# Patient Record
Sex: FEMALE | Race: OTHER | HISPANIC OR LATINO
[De-identification: names, ages, dates, MRNs, and addresses within clinical notes are randomized per-mention and may not be internally consistent; named-entity substitution may affect disease eponyms.]

---

## 2017-03-03 PROBLEM — Z00.00 ENCOUNTER FOR PREVENTIVE HEALTH EXAMINATION: Status: ACTIVE | Noted: 2017-03-03

## 2017-03-10 ENCOUNTER — APPOINTMENT (OUTPATIENT)
Dept: OBGYN | Facility: CLINIC | Age: 22
End: 2017-03-10

## 2017-04-14 ENCOUNTER — APPOINTMENT (OUTPATIENT)
Dept: OBGYN | Facility: CLINIC | Age: 22
End: 2017-04-14

## 2017-04-14 VITALS
WEIGHT: 209 LBS | DIASTOLIC BLOOD PRESSURE: 75 MMHG | SYSTOLIC BLOOD PRESSURE: 110 MMHG | HEIGHT: 63 IN | BODY MASS INDEX: 37.03 KG/M2

## 2017-04-14 DIAGNOSIS — L70.9 ACNE, UNSPECIFIED: ICD-10-CM

## 2017-04-14 DIAGNOSIS — Z78.9 OTHER SPECIFIED HEALTH STATUS: ICD-10-CM

## 2017-04-14 DIAGNOSIS — E88.81 METABOLIC SYNDROME: ICD-10-CM

## 2017-04-14 DIAGNOSIS — Z01.419 ENCOUNTER FOR GYNECOLOGICAL EXAMINATION (GENERAL) (ROUTINE) W/OUT ABNORMAL FINDINGS: ICD-10-CM

## 2017-04-14 DIAGNOSIS — A74.9 CHLAMYDIAL INFECTION, UNSPECIFIED: ICD-10-CM

## 2017-04-18 ENCOUNTER — APPOINTMENT (OUTPATIENT)
Dept: OBGYN | Facility: CLINIC | Age: 22
End: 2017-04-18

## 2017-04-18 LAB
C TRACH RRNA SPEC QL NAA+PROBE: NORMAL
HBV SURFACE AG SER QL: NONREACTIVE
HCV AB SER QL: NONREACTIVE
HCV S/CO RATIO: 0.12 S/CO
HIV1+2 AB SPEC QL IA.RAPID: NONREACTIVE
N GONORRHOEA RRNA SPEC QL NAA+PROBE: NORMAL
RPR SER QL: NORMAL
SOURCE AMPLIFICATION: NORMAL

## 2017-04-19 LAB
SOURCE AMPLIFICATION: NORMAL
T VAGINALIS RRNA SPEC QL NAA+PROBE: NEGATIVE

## 2017-04-21 LAB — CYTOLOGY CVX/VAG DOC THIN PREP: NORMAL

## 2017-04-25 ENCOUNTER — APPOINTMENT (OUTPATIENT)
Dept: OBGYN | Facility: CLINIC | Age: 22
End: 2017-04-25

## 2017-12-20 ENCOUNTER — TRANSCRIPTION ENCOUNTER (OUTPATIENT)
Age: 22
End: 2017-12-20

## 2018-04-05 ENCOUNTER — APPOINTMENT (OUTPATIENT)
Dept: OBGYN | Facility: CLINIC | Age: 23
End: 2018-04-05

## 2019-02-19 ENCOUNTER — TRANSCRIPTION ENCOUNTER (OUTPATIENT)
Age: 24
End: 2019-02-19

## 2019-09-10 ENCOUNTER — TRANSCRIPTION ENCOUNTER (OUTPATIENT)
Age: 24
End: 2019-09-10

## 2020-06-12 ENCOUNTER — EMERGENCY (EMERGENCY)
Facility: HOSPITAL | Age: 25
LOS: 0 days | Discharge: HOME | End: 2020-06-12
Attending: EMERGENCY MEDICINE
Payer: MEDICAID

## 2020-06-12 VITALS — WEIGHT: 205.03 LBS | HEIGHT: 63 IN

## 2020-06-12 VITALS
OXYGEN SATURATION: 100 % | DIASTOLIC BLOOD PRESSURE: 83 MMHG | SYSTOLIC BLOOD PRESSURE: 126 MMHG | RESPIRATION RATE: 18 BRPM | HEART RATE: 98 BPM

## 2020-06-12 DIAGNOSIS — H92.03 OTALGIA, BILATERAL: ICD-10-CM

## 2020-06-12 PROCEDURE — 99282 EMERGENCY DEPT VISIT SF MDM: CPT

## 2020-06-12 NOTE — ED ADULT TRIAGE NOTE - CHIEF COMPLAINT QUOTE
Patient with "ear sensitivity."  States she is having issues hearing in both ears and having pain.  It started in the right ear and now is in both.  Denies fevers, drainage or redness to area.

## 2020-06-12 NOTE — ED STATDOCS - CARE PROVIDER_API CALL
Nigel Serra  NEUROLOGY  [address] [address]  Phone: [phone]  Fax: [phone]  Follow Up Time:

## 2020-06-12 NOTE — ED STATDOCS - PATIENT PORTAL LINK FT
You can access the FollowMyHealth Patient Portal offered by [facility] by registering at the following website: [email or web address]. By joining [facility]’s FollowMyHealth portal, you will also be able to view your health information using other applications (apps) compatible with our system.

## 2020-06-12 NOTE — ED STATDOCS - CLINICAL SUMMARY MEDICAL DECISION MAKING FREE TEXT BOX
Pt has normal neurological exam and nothing in H&P suggests neuro imaging, pt advised that if she has any recurring sx, she can f/u with neurology or repeat visit in ED.

## 2020-06-12 NOTE — ED STATDOCS - NSFOLLOWUPINSTRUCTIONS_ED_ALL_ED_FT
Follow up with a neurologist for further evaluation of your symptoms. You can take motrin or tylenol for pain and avoid more exposure to loud noises.    Return to the ER for any new or other concerns.

## 2020-06-12 NOTE — ED STATDOCS - ATTENDING CONTRIBUTION TO CARE
I, Gary Means, performed the initial face to face bedside interview with this patient regarding history of present illness, review of symptoms and relevant past medical, social and family history.  I completed an independent physical examination.  I was the initial provider who evaluated this patient. I have signed out the follow up of any pending tests (i.e. labs, radiological studies) to the ACP.  I have communicated the patient’s plan of care and disposition with the ACP.  The history, relevant review of systems, past medical and surgical history, medical decision making, and physical examination was documented by the scribe in my presence and I attest to the accuracy of the documentation.

## 2020-06-12 NOTE — ED STATDOCS - OBJECTIVE STATEMENT
23 y/o F presents with bilateral ear pain. Pt states she feels increased sensitivity to sound, never experienced similar sx. States she was exposed to loud noise a year ago a child screamed in her ear and she heard a popping noise in her ear. Pt went to ED then due to feeling facial numbness and had imaging done. Then a few weeks ago pt had her dog bark in her right ear and she felt a similar pain and now pain is radiating to left ear as well. Denies drainage from ears. Pt went to ENT today and had a hearing test done. Denies numbness and tingling to extremities.

## 2020-06-12 NOTE — ED STATDOCS - PROGRESS NOTE DETAILS
25 yo female presents with b/l ear paina nd tinglign toteh R face. Pt states she was exposed to a long noise after a child yelled in her R ear approximately 1 year ago and had a partial MRI (bc she couldn't tolerate the MRI) and a CT which were unremarkable. A few weeks ago, she was exposed to her dog barking in her R ear and is starting to have those symptoms again. Pt was seen by an ENt specialist today and had a hearing test which was unremarkable. Pt's neuro exam was unremarkable. Pt states she had a MRI when she was seen last year at [facility] and requesting another MRI. Pt advised that MRIs are usually done in an outpt setting and we can give a number for a neurologist that she can f/u with who can eventually order the test if warranted. Pt aware and agrees with plan. -Prasanna Triplett PA-C

## 2020-06-15 PROBLEM — Z78.9 OTHER SPECIFIED HEALTH STATUS: Chronic | Status: ACTIVE | Noted: 2020-06-12

## 2020-06-17 ENCOUNTER — APPOINTMENT (OUTPATIENT)
Dept: NEUROLOGY | Facility: CLINIC | Age: 25
End: 2020-06-17
Payer: MEDICAID

## 2020-06-17 ENCOUNTER — TRANSCRIPTION ENCOUNTER (OUTPATIENT)
Age: 25
End: 2020-06-17

## 2020-06-17 DIAGNOSIS — H02.401 UNSPECIFIED PTOSIS OF RIGHT EYELID: ICD-10-CM

## 2020-06-17 DIAGNOSIS — R20.0 ANESTHESIA OF SKIN: ICD-10-CM

## 2020-06-17 DIAGNOSIS — F40.298 OTHER SPECIFIED PHOBIA: ICD-10-CM

## 2020-06-17 PROCEDURE — 99205 OFFICE O/P NEW HI 60 MIN: CPT | Mod: 95

## 2020-06-17 RX ORDER — ALPRAZOLAM 0.25 MG/1
0.25 TABLET ORAL
Qty: 4 | Refills: 0 | Status: ACTIVE | COMMUNITY
Start: 2020-06-17 | End: 1900-01-01

## 2020-06-17 NOTE — HISTORY OF PRESENT ILLNESS
[Home] : at home, [unfilled] , at the time of the visit. [Medical Office: ([facility])___] : at the medical office located in  [Verbal consent obtained from patient] : the patient, [unfilled] [FreeTextEntry1] : 24-year-old right-handed female with PMHx of anxiety; presents today for evaluation of severe hypersensitivity to loud sounds/hyperacusis right ear, intermittent right facial hypersensitivity/numbness and one recent episode of transient right eyelid droop.\par \par Patient reports it started a year ago, she was at work, (works in pre-school); one of the children shouted / shrieked in her right ear, soon after she heard a pop and right ear, it was followed by numbness right side of face and arm, it persisted, she went to [facility], ER, had CT scan and MRI brain, MRI was incomplete as she could not tolerate the study, she reports that she was told she may have mild Chiari malformation, studies are not available for me to review. Patient reports that over the past year, her symptoms improved remarkably.\par \par A month ago, she reports that her dog was barking loudly, since then she has had severe hypersensitivity post sound on the right side, in addition she has a strange hypersensitivity sensation in the right side of face, she also reports intermittent photophobia. Patient consulted ENT specialist, she reports work-up is in progress. She brings to my attention that 2 days ago, she woke up and noted a right eyelid was droopy, the symptoms resolved within hours. Patient denies associated double vision, visual loss, headache, nausea, vomiting, neck pain, speech disturbance, gait instability or facial droop.

## 2020-06-17 NOTE — PHYSICAL EXAM
[General Appearance - Alert] : alert [General Appearance - In No Acute Distress] : in no acute distress [Mood] : the mood was normal [Person] : oriented to person [Place] : oriented to place [Registration Intact] : recent registration memory intact [Time] : oriented to time [Naming Objects] : no difficulty naming common objects [Repeating Phrases] : no difficulty repeating a phrase [Comprehension] : comprehension intact [Current Events] : adequate knowledge of current events [Fluency] : fluency intact [Cranial Nerves Facial (VII)] : face symmetrical [Cranial Nerves Oculomotor (III)] : extraocular motion intact [Cranial Nerves Optic (II)] : visual acuity intact bilaterally,  visual fields full to confrontation, pupils equal round and reactive to light [Cranial Nerves Vestibulocochlear (VIII)] : hearing was intact bilaterally [Cranial Nerves Hypoglossal (XII)] : there was no tongue deviation with protrusion [Cranial Nerves Accessory (XI - Cranial And Spinal)] : head turning and shoulder shrug symmetric [Abnormal Walk] : normal gait [Balance] : balance was intact [Full Visual Field] : full visual field [Extraocular Movements] : extraocular movements were intact [Romberg's Sign] : Romberg's sign was negtive [Coordination - Dysmetria Impaired Finger-to-Nose Bilateral] : not present [Tremor] : no tremor present [FreeTextEntry6] : Limited virtual motor examination: No pronator drift, Lungs all 4 extremities antigravity, no tremor with extended hands [FreeTextEntry1] : Neck ROM Full

## 2020-06-17 NOTE — REVIEW OF SYSTEMS
[Numbness] : numbness [As Noted in HPI] : as noted in HPI [Negative] : Endocrine [FreeTextEntry4] : Phonophobia [FreeTextEntry3] : Transient right eye ptosis

## 2020-06-17 NOTE — REASON FOR VISIT
[Consultation] : a consultation visit [FreeTextEntry1] : For evaluation of severe hypersensitivity to sound and right facial numbness

## 2020-06-17 NOTE — DISCUSSION/SUMMARY
[FreeTextEntry1] : 24-year-old female with PMHx of anxiety; reports severe hypersensitivity to loud sounds/hyperacusis right ear that has evolved over a year, worse since x a month, in addition she reports intermittent right facial hypersensitivity/numbness and one recent episode of transient right eyelid droop. \par \par # Rule out brainstem/CP angle lesion\par \par # Rule out remote possibility of a right Cristopher syndrome, exam is limited with virtual exam\par \par - ENT work-up is in progress, have recommended completing wrok-up.\par - We will obtain MRI of the brain and IAC in addition to MRA of the carotids\par - F/U with me in 4 weeks fro exam, rest of the recommendations will be made at that time.

## 2020-07-13 ENCOUNTER — APPOINTMENT (OUTPATIENT)
Dept: OTOLARYNGOLOGY | Facility: CLINIC | Age: 25
End: 2020-07-13
Payer: MEDICAID

## 2020-07-13 VITALS
SYSTOLIC BLOOD PRESSURE: 130 MMHG | BODY MASS INDEX: 36.5 KG/M2 | HEART RATE: 103 BPM | TEMPERATURE: 98.2 F | HEIGHT: 63 IN | DIASTOLIC BLOOD PRESSURE: 84 MMHG | WEIGHT: 206 LBS

## 2020-07-13 DIAGNOSIS — F41.9 ANXIETY DISORDER, UNSPECIFIED: ICD-10-CM

## 2020-07-13 DIAGNOSIS — H93.11 TINNITUS, RIGHT EAR: ICD-10-CM

## 2020-07-13 DIAGNOSIS — H93.233 HYPERACUSIS, BILATERAL: ICD-10-CM

## 2020-07-13 DIAGNOSIS — F32.9 MAJOR DEPRESSIVE DISORDER, SINGLE EPISODE, UNSPECIFIED: ICD-10-CM

## 2020-07-13 PROCEDURE — 99204 OFFICE O/P NEW MOD 45 MIN: CPT

## 2020-07-13 NOTE — HISTORY OF PRESENT ILLNESS
[No] : patient does not have a  history of radiation therapy [de-identified] : 25 yo female\par Patient complains of noise sensitivity States she was exposed to loud noise last year someone yelled in her ear . States loud noises hurt her ears, wears ear plugs with moderate relief. Has seen a different ENT, had MRI and CT which were wnl. \par Pt has no ear pain, ear drainage, hearing loss, tinnitus, vertigo, nasal congestion, nasal discharge, epistaxis, sinus infections, facial pain, facial pressure, throat pain, dysphagia or fevers\par crying when telling her hx to me\par \par  [Anxiety] : no anxiety [Dizziness] : no dizziness [Hearing Loss] : no hearing loss [Headache] : no headache [Recurrent Otitis Media] : no recurrent otitis media [Otitis Media with Effusion] : no otitis media with effusion [Presbycusis] : no presbycusis [Congenital Ear Malformation] : no congenital ear malformation [Meniere Disease] : no Meniere disease [Otosclerosis] : no otosclerosis [Hypertension] : no hypertension [Perilymphatic Fistula] : no perilymphatic fistula [Loud Noise Exposure] : no history of loud noise exposure [Smoking] : no smoking [Stroke] : no stroke [Early Onset Hearing Loss] : no early onset hearing loss [Facial Pain] : no facial pain [Facial Pressure] : no facial pressure [Nasal Congestion] : no nasal congestion [Ear Fullness] : no ear fullness [Allergic Rhinitis] : no allergic rhinitis [Diplopia] : no diplopia [Maxillary Tooth Infection] : no maxillary tooth infection [Septal Deviation] : no septal deviation [Seasonal Allergies] : no seasonal allergies [Environmental Allergies] : no environmental allergies [Environmental Allergens] : no environmental allergens [Nasal FB Removal] : no nasal foreign body removal [Adenoidectomy] : no adenoidectomy [Asthma] : no asthma [Allergies] : no allergies [Neck Mass] : no neck mass [Neck Pain] : no neck pain [Cold Intolerance] : no cold intolerance [Chills] : no chills [Fatigue] : no fatigue [Cough] : no cough [Heat Intolerance] : no heat intolerance [Hyperthyroidism] : no hyperthyroidism [Hodgkin Disease] : no hodgkin disease [Sialadenitis] : no sialadenitis [Tobacco Use] : no tobacco use [Non-Hodgkin Lymphoma] : no non-hodgkin lymphoma [Alcohol Use] : no alcohol use [Graves Disease] : no graves disease [Thyroid Cancer] : no thyroid cancer

## 2020-07-13 NOTE — ASSESSMENT
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

## 2020-07-14 ENCOUNTER — NON-APPOINTMENT (OUTPATIENT)
Age: 25
End: 2020-07-14

## 2020-10-05 ENCOUNTER — TRANSCRIPTION ENCOUNTER (OUTPATIENT)
Age: 25
End: 2020-10-05

## 2020-11-05 ENCOUNTER — RESULT REVIEW (OUTPATIENT)
Age: 25
End: 2020-11-05

## 2021-03-29 ENCOUNTER — APPOINTMENT (OUTPATIENT)
Dept: GASTROENTEROLOGY | Facility: CLINIC | Age: 26
End: 2021-03-29
Payer: MEDICAID

## 2021-03-29 VITALS
DIASTOLIC BLOOD PRESSURE: 80 MMHG | HEART RATE: 90 BPM | SYSTOLIC BLOOD PRESSURE: 120 MMHG | HEIGHT: 63 IN | WEIGHT: 215 LBS | BODY MASS INDEX: 38.09 KG/M2 | OXYGEN SATURATION: 96 %

## 2021-03-29 DIAGNOSIS — R13.10 DYSPHAGIA, UNSPECIFIED: ICD-10-CM

## 2021-03-29 PROCEDURE — 99204 OFFICE O/P NEW MOD 45 MIN: CPT

## 2021-03-29 PROCEDURE — 99072 ADDL SUPL MATRL&STAF TM PHE: CPT

## 2021-03-29 NOTE — ASSESSMENT
[FreeTextEntry1] : Impression: The patient is a pleasant 25  year old woman with a PMH of morbid obesity who is referred for an initial Gastroenterology consultation for the evaluation of progressive dysphagia and worsening reflux. She also has had epigastric burning. \par \par Plan:\par \par 1) Gastroenterology: symptoms of dysphagia to solids  underlying esophageal disorder such stricture. She will have an EGD with dilation. I explained to the patient the risks, alternatives and benefits to an EGD. Risk including but not limited to bleeding, perforation, infection adverse medication reaction. Questions were answered. agreeable to proceed with the planned procedures. \par \par \par  proceed with scheduling an EGD to rule out an underlying anatomical lesion that may be causing symptoms of dysphagia (such as an esophageal stricture or ring) and additionally to obtain esophageal biopsies to evaluate for eosinophilic esophagitis, as this could also potentially account for her symptoms.\par \par She will have gastric biopsies at the time to look for H Pylori infection (exposure risk). \par \par Further recommendations pending results of above work up and evaluation.\par \par

## 2021-03-29 NOTE — HISTORY OF PRESENT ILLNESS
[No] : patient does not have a  history of radiation therapy [de-identified] : 23 yo female\par Patient complains of noise sensitivity States she was exposed to loud noise last year someone yelled in her ear . States loud noises hurt her ears, wears ear plugs with moderate relief. Has seen a different ENT, had MRI and CT which were wnl. \par Pt has no ear pain, ear drainage, hearing loss, tinnitus, vertigo, nasal congestion, nasal discharge, epistaxis, sinus infections, facial pain, facial pressure, throat pain, dysphagia or fevers\par crying when telling her hx to me\par \par  [de-identified] : Pma is a pleasant 25 year old female who is referred for an initial Gastroenterology consultation for the evaluation of suspected gastroesophageal reflux disease (GERD).  \par \par The patient was in her usual state of health until 8 months ago when he began having symptoms from post-prandial heartburn and regurgitation. These symptoms tend to be worse with acidic or heavy meals and have recently become progressive and have also been accompanied by frequently post-prandial belching.\par \par In addition, she reports a food "sticking" sensation in his mid-esophagus. She states that this sensation has become progressively worse since that time. Additionally, she has also been regurgitating both liquids and solids after meals. Initially, this was only happening intermittently, but more recently has been occurring with nearly ever meal. \par \par She subsequently saw her PCP and was started on a proton pump inhibitor due to suspected GERD as an etiology of her symptoms.  She reports that he has had mild improvement in his symptoms since that time, but continues to have nearly daily post-prandial regurgitation.\par \par Other than heartburn and regurgitation, the patient offers no additional symptoms at today's visit. She specifically denies any odynophagia, hoarseness, changes in voice, heartburn, nausea, vomiting, localized/focal abdominal pain, unintentional weight loss, fevers, chills, skin rash, loose stool, melena or hematochezia. \par \par

## 2021-06-03 ENCOUNTER — NON-APPOINTMENT (OUTPATIENT)
Age: 26
End: 2021-06-03

## 2021-06-03 ENCOUNTER — APPOINTMENT (OUTPATIENT)
Dept: CARDIOLOGY | Facility: CLINIC | Age: 26
End: 2021-06-03
Payer: MEDICAID

## 2021-06-03 VITALS
SYSTOLIC BLOOD PRESSURE: 116 MMHG | BODY MASS INDEX: 39.34 KG/M2 | WEIGHT: 222 LBS | HEIGHT: 63 IN | OXYGEN SATURATION: 99 % | HEART RATE: 76 BPM | TEMPERATURE: 97.8 F | DIASTOLIC BLOOD PRESSURE: 70 MMHG

## 2021-06-03 DIAGNOSIS — R53.83 OTHER FATIGUE: ICD-10-CM

## 2021-06-03 DIAGNOSIS — Z01.810 ENCOUNTER FOR PREPROCEDURAL CARDIOVASCULAR EXAMINATION: ICD-10-CM

## 2021-06-03 PROCEDURE — 93000 ELECTROCARDIOGRAM COMPLETE: CPT

## 2021-06-03 PROCEDURE — 99204 OFFICE O/P NEW MOD 45 MIN: CPT

## 2021-06-03 RX ORDER — ESCITALOPRAM OXALATE 20 MG/1
20 TABLET ORAL
Refills: 0 | Status: ACTIVE

## 2021-06-03 RX ORDER — METFORMIN ER 500 MG 500 MG/1
500 TABLET ORAL
Refills: 0 | Status: DISCONTINUED | COMMUNITY
End: 2021-06-03

## 2021-06-03 RX ORDER — MIRTAZAPINE 7.5 MG/1
TABLET, FILM COATED ORAL
Refills: 0 | Status: DISCONTINUED | COMMUNITY
End: 2021-06-03

## 2021-06-03 RX ORDER — HUMAN PAPILLOMAVIRUS QUADRIVALENT (TYPES 6, 11, 16, AND 18) VACCINE, RECOMBINANT 20; 40; 40; 20 UG/.5ML; UG/.5ML; UG/.5ML; UG/.5ML
INJECTION, SUSPENSION INTRAMUSCULAR
Qty: 1 | Refills: 1 | Status: DISCONTINUED | COMMUNITY
Start: 2017-04-14 | End: 2021-06-03

## 2021-06-03 RX ORDER — PAROXETINE HYDROCHLORIDE 10 MG/1
10 TABLET, FILM COATED ORAL
Refills: 0 | Status: DISCONTINUED | COMMUNITY
End: 2021-06-03

## 2021-06-08 ENCOUNTER — TRANSCRIPTION ENCOUNTER (OUTPATIENT)
Age: 26
End: 2021-06-08

## 2021-06-11 ENCOUNTER — APPOINTMENT (OUTPATIENT)
Dept: CARDIOLOGY | Facility: CLINIC | Age: 26
End: 2021-06-11
Payer: MEDICAID

## 2021-06-11 DIAGNOSIS — R06.00 DYSPNEA, UNSPECIFIED: ICD-10-CM

## 2021-06-11 PROCEDURE — 93306 TTE W/DOPPLER COMPLETE: CPT

## 2021-06-16 ENCOUNTER — APPOINTMENT (OUTPATIENT)
Dept: GASTROENTEROLOGY | Facility: AMBULATORY MEDICAL SERVICES | Age: 26
End: 2021-06-16

## 2021-06-30 ENCOUNTER — APPOINTMENT (OUTPATIENT)
Dept: CARDIOLOGY | Facility: CLINIC | Age: 26
End: 2021-06-30
Payer: MEDICAID

## 2021-06-30 LAB
25(OH)D3 SERPL-MCNC: 19.8 NG/ML
ALBUMIN SERPL ELPH-MCNC: 4.3 G/DL
ALP BLD-CCNC: 96 U/L
ALT SERPL-CCNC: 17 U/L
ANION GAP SERPL CALC-SCNC: 11 MMOL/L
AST SERPL-CCNC: 15 U/L
BASOPHILS # BLD AUTO: 0.04 K/UL
BASOPHILS NFR BLD AUTO: 0.6 %
BILIRUB SERPL-MCNC: 0.3 MG/DL
BUN SERPL-MCNC: 8 MG/DL
CALCIUM SERPL-MCNC: 9.6 MG/DL
CHLORIDE SERPL-SCNC: 104 MMOL/L
CHOLEST SERPL-MCNC: 163 MG/DL
CO2 SERPL-SCNC: 26 MMOL/L
CREAT SERPL-MCNC: 0.63 MG/DL
EOSINOPHIL # BLD AUTO: 0.13 K/UL
EOSINOPHIL NFR BLD AUTO: 2 %
ESTIMATED AVERAGE GLUCOSE: 111 MG/DL
GLUCOSE SERPL-MCNC: 93 MG/DL
HBA1C MFR BLD HPLC: 5.5 %
HCT VFR BLD CALC: 43 %
HDLC SERPL-MCNC: 40 MG/DL
HGB BLD-MCNC: 13.8 G/DL
IMM GRANULOCYTES NFR BLD AUTO: 0.2 %
LDLC SERPL CALC-MCNC: 85 MG/DL
LYMPHOCYTES # BLD AUTO: 2.13 K/UL
LYMPHOCYTES NFR BLD AUTO: 32.8 %
MAN DIFF?: NORMAL
MCHC RBC-ENTMCNC: 29.9 PG
MCHC RBC-ENTMCNC: 32.1 GM/DL
MCV RBC AUTO: 93.1 FL
MONOCYTES # BLD AUTO: 0.39 K/UL
MONOCYTES NFR BLD AUTO: 6 %
NEUTROPHILS # BLD AUTO: 3.79 K/UL
NEUTROPHILS NFR BLD AUTO: 58.4 %
NONHDLC SERPL-MCNC: 123 MG/DL
PLATELET # BLD AUTO: 302 K/UL
POTASSIUM SERPL-SCNC: 4.3 MMOL/L
PROT SERPL-MCNC: 6.8 G/DL
RBC # BLD: 4.62 M/UL
RBC # FLD: 13.3 %
SODIUM SERPL-SCNC: 141 MMOL/L
T3FREE SERPL-MCNC: 2.95 PG/ML
T4 FREE SERPL-MCNC: 1 NG/DL
TRIGL SERPL-MCNC: 189 MG/DL
TSH SERPL-ACNC: 1.48 UIU/ML
WBC # FLD AUTO: 6.49 K/UL

## 2021-06-30 PROCEDURE — 93015 CV STRESS TEST SUPVJ I&R: CPT

## 2021-07-02 ENCOUNTER — APPOINTMENT (OUTPATIENT)
Dept: DISASTER EMERGENCY | Facility: CLINIC | Age: 26
End: 2021-07-02

## 2021-07-02 DIAGNOSIS — Z01.818 ENCOUNTER FOR OTHER PREPROCEDURAL EXAMINATION: ICD-10-CM

## 2021-07-03 LAB — SARS-COV-2 N GENE NPH QL NAA+PROBE: NOT DETECTED

## 2021-07-07 ENCOUNTER — APPOINTMENT (OUTPATIENT)
Dept: GASTROENTEROLOGY | Facility: AMBULATORY MEDICAL SERVICES | Age: 26
End: 2021-07-07
Payer: MEDICAID

## 2021-07-07 ENCOUNTER — RESULT REVIEW (OUTPATIENT)
Age: 26
End: 2021-07-07

## 2021-07-07 PROCEDURE — 43239 EGD BIOPSY SINGLE/MULTIPLE: CPT

## 2021-07-07 NOTE — HISTORY OF PRESENT ILLNESS
[FreeTextEntry1] : Pam is a pleasant 25-year-old female with clinical obesity by BMI who comes to the office for cardiac assessment.  She has complaints of fatigue along with exertional dyspnea.  Preop assessment also planned.  No current chest pains, palpitations, syncope or edema noted.  She is eating generally healthy.

## 2021-07-07 NOTE — PHYSICAL EXAM
[Well Nourished] : well nourished [Well Developed] : well developed [No Acute Distress] : no acute distress [Normal Conjunctiva] : normal conjunctiva [Normal Venous Pressure] : normal venous pressure [No Carotid Bruit] : no carotid bruit [Normal S1, S2] : normal S1, S2 [No Murmur] : no murmur [No Rub] : no rub [No Gallop] : no gallop [1+] : left 1+ [Clear Lung Fields] : clear lung fields [Good Air Entry] : good air entry [No Respiratory Distress] : no respiratory distress  [Soft] : abdomen soft [Non Tender] : non-tender [No Masses/organomegaly] : no masses/organomegaly [Normal Bowel Sounds] : normal bowel sounds [Normal Gait] : normal gait [No Edema] : no edema [No Cyanosis] : no cyanosis [No Clubbing] : no clubbing [No Varicosities] : no varicosities [No Rash] : no rash [No Skin Lesions] : no skin lesions [Moves all extremities] : moves all extremities [No Focal Deficits] : no focal deficits [Normal Speech] : normal speech [Alert and Oriented] : alert and oriented [Normal memory] : normal memory [Right Carotid Bruit] : no bruit heard over the right carotid [Left Carotid Bruit] : no bruit heard over the left carotid [Bruit] : no bruit heard

## 2021-07-07 NOTE — DISCUSSION/SUMMARY
[___ Week(s)] : in [unfilled] week(s) [FreeTextEntry3] : Echo and regular stress test [FreeTextEntry1] : I have ordered an echocardiogram to assess LV function and valvular status.  I will order a regular treadmill stress test to assess cardiac fitness and rule out any provocable ECG changes as well as check vital assessment to exercise.  I recommended a heart healthy lifestyle including a low-saturated fat, low cholesterol diet with improved aerobic physical fitness over time for cardiovascular benefits.  Carbohydrate and sodium restriction along with weight loss over time encouraged.  We will call the patient with test results and followup with you for general care.  An addendum to this note regarding fitness for surgery will be added pending results of cardiac testing.

## 2021-07-07 NOTE — REASON FOR VISIT
[Symptom and Test Evaluation] : symptom and test evaluation [FreeTextEntry1] : She underwent echocardiogram revealing preserved LV size and systolic function and no valvular heart disease.  She underwent a regular treadmill stress test showing no ECG evidence of myocardial ischemia at age-predicted maximal heart rate and good exercise tolerance noted.  Occasional isolated PVCs were seen.  Based on her current clinical and cardiac standpoint, she may proceed ahead with her upcoming bariatric surgery with routine hemodynamic monitoring perioperatively.

## 2021-07-20 PROBLEM — R06.00 DOE (DYSPNEA ON EXERTION): Status: ACTIVE | Noted: 2021-06-03

## 2021-07-22 ENCOUNTER — NON-APPOINTMENT (OUTPATIENT)
Age: 26
End: 2021-07-22

## 2021-08-30 ENCOUNTER — APPOINTMENT (OUTPATIENT)
Dept: GASTROENTEROLOGY | Facility: CLINIC | Age: 26
End: 2021-08-30

## 2021-09-21 ENCOUNTER — TRANSCRIPTION ENCOUNTER (OUTPATIENT)
Age: 26
End: 2021-09-21

## 2021-09-23 ENCOUNTER — APPOINTMENT (OUTPATIENT)
Dept: DISASTER EMERGENCY | Facility: HOSPITAL | Age: 26
End: 2021-09-23
